# Patient Record
Sex: FEMALE | Race: AMERICAN INDIAN OR ALASKA NATIVE
[De-identification: names, ages, dates, MRNs, and addresses within clinical notes are randomized per-mention and may not be internally consistent; named-entity substitution may affect disease eponyms.]

---

## 2018-05-02 ENCOUNTER — HOSPITAL ENCOUNTER (EMERGENCY)
Dept: HOSPITAL 5 - ED | Age: 33
Discharge: HOME | End: 2018-05-02
Payer: COMMERCIAL

## 2018-05-02 VITALS — SYSTOLIC BLOOD PRESSURE: 117 MMHG | DIASTOLIC BLOOD PRESSURE: 66 MMHG
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
## 2018-05-02 DIAGNOSIS — M79.652: ICD-10-CM

## 2018-05-02 DIAGNOSIS — M25.562: ICD-10-CM

## 2018-05-02 DIAGNOSIS — M79.632: Primary | ICD-10-CM
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
## 2018-05-02 PROCEDURE — 99283 EMERGENCY DEPT VISIT LOW MDM: CPT

## 2018-05-02 NOTE — XRAY REPORT
FINAL REPORT



EXAM:  XR FOREARM LT



HISTORY:  left lower arm injury/pain 



TECHNIQUE:  Left forearm two views 



PRIORS:  None.



FINDINGS:  

No fracture is identified. The joint spaces are within normal

limits. No focal bony lesion identified. No radiopaque foreign

body seen. 



IMPRESSION:  

Negative no acute abnormality.

## 2018-05-02 NOTE — XRAY REPORT
FINAL REPORT



EXAM:  XR HUMERUS 2+V LT



HISTORY:  left upper arm injury/pain 



TECHNIQUE:  



PRIORS:  None.



FINDINGS:  

No fracture is identified. The joint spaces are within normal

limits. No focal bony lesion identified. No radiopaque foreign

body seen. 



IMPRESSION:  

Negative no acute abnormality.

## 2018-05-02 NOTE — XRAY REPORT
FINAL REPORT



EXAM:  XR FEMUR 2+V LT



HISTORY:  left upper leg injury/pain 



TECHNIQUE:  Left femur two views 



PRIORS:  None.



FINDINGS:  

No fracture is identified. The joint spaces are within normal

limits. No focal bony lesion identified. No radiopaque foreign

body seen. 



IMPRESSION:  

Negative no acute abnormality.
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
## 2018-05-02 NOTE — XRAY REPORT
FINAL REPORT



EXAM:  XR KNEE 3V LT



HISTORY:  left knee injury/pain 



TECHNIQUE:  Left knee three views 



PRIORS:  None.



FINDINGS:  

No fracture is identified. No dislocation seen. No evidence of

joint effusion. Patella demonstrates normal positioning. No acute

bony abnormality identified. 



IMPRESSION:  

Negative knee series